# Patient Record
Sex: MALE | Employment: OTHER | URBAN - METROPOLITAN AREA
[De-identification: names, ages, dates, MRNs, and addresses within clinical notes are randomized per-mention and may not be internally consistent; named-entity substitution may affect disease eponyms.]

---

## 2017-12-22 ENCOUNTER — PREPPED CHART (OUTPATIENT)
Dept: URBAN - METROPOLITAN AREA CLINIC 36 | Facility: CLINIC | Age: 47
End: 2017-12-22

## 2017-12-22 ASSESSMENT — VISUAL ACUITY
OD_SC: 20/20-1
OS_PH: 20/20
OS_SC: J2
OD_SC: J3
OS_SC: 20/30-1

## 2017-12-22 ASSESSMENT — TONOMETRY
OS_IOP_MMHG: 15
OD_IOP_MMHG: 14

## 2017-12-26 ENCOUNTER — ESTABLISHED COMPREHENSIVE EXAM (OUTPATIENT)
Dept: URBAN - METROPOLITAN AREA CLINIC 36 | Facility: CLINIC | Age: 47
End: 2017-12-26

## 2017-12-26 DIAGNOSIS — H04.123: ICD-10-CM

## 2017-12-26 DIAGNOSIS — Z96.1: ICD-10-CM

## 2017-12-26 DIAGNOSIS — H18.51: ICD-10-CM

## 2017-12-26 PROCEDURE — 1036F TOBACCO NON-USER: CPT

## 2017-12-26 PROCEDURE — 92015 DETERMINE REFRACTIVE STATE: CPT

## 2017-12-26 PROCEDURE — G8427 DOCREV CUR MEDS BY ELIG CLIN: HCPCS

## 2017-12-26 PROCEDURE — 92014 COMPRE OPH EXAM EST PT 1/>: CPT

## 2017-12-26 ASSESSMENT — VISUAL ACUITY
OD_SC: 20/20
OS_PH: 20/20-2
OS_SC: J1
OS_SC: 20/40-1
OD_SC: J2

## 2017-12-26 ASSESSMENT — TONOMETRY
OS_IOP_MMHG: 10
OD_IOP_MMHG: 11

## 2018-12-14 ENCOUNTER — ESTABLISHED COMPREHENSIVE EXAM (OUTPATIENT)
Dept: URBAN - METROPOLITAN AREA CLINIC 36 | Facility: CLINIC | Age: 48
End: 2018-12-14

## 2018-12-14 DIAGNOSIS — H26.491: ICD-10-CM

## 2018-12-14 DIAGNOSIS — H26.492: ICD-10-CM

## 2018-12-14 DIAGNOSIS — H04.123: ICD-10-CM

## 2018-12-14 PROCEDURE — 92014 COMPRE OPH EXAM EST PT 1/>: CPT

## 2018-12-14 PROCEDURE — 92015GRNC REFRACTION GLASSES RECHECK - NO CHARGE

## 2018-12-14 ASSESSMENT — VISUAL ACUITY
OS_SC: 20/25-1
OD_SC: J3
OD_SC: 20/20-2
OS_SC: J1

## 2018-12-14 ASSESSMENT — TONOMETRY
OD_IOP_MMHG: 10
OS_IOP_MMHG: 9

## 2021-07-02 ENCOUNTER — DILATED FUNDUS EXAM (OUTPATIENT)
Dept: URBAN - METROPOLITAN AREA CLINIC 36 | Facility: CLINIC | Age: 51
End: 2021-07-02

## 2021-07-02 DIAGNOSIS — H26.492: ICD-10-CM

## 2021-07-02 DIAGNOSIS — H26.491: ICD-10-CM

## 2021-07-02 DIAGNOSIS — H04.123: ICD-10-CM

## 2021-07-02 PROCEDURE — 92014 COMPRE OPH EXAM EST PT 1/>: CPT

## 2021-07-02 ASSESSMENT — VISUAL ACUITY
OD_SC: 20/25+2
OD_SC: J2
OS_SC: 20/25
OS_SC: J1

## 2021-07-02 ASSESSMENT — TONOMETRY
OS_IOP_MMHG: 13
OD_IOP_MMHG: 13

## 2023-10-05 ENCOUNTER — ESTABLISHED PATIENT (OUTPATIENT)
Dept: URBAN - METROPOLITAN AREA CLINIC 36 | Facility: CLINIC | Age: 53
End: 2023-10-05

## 2023-10-05 DIAGNOSIS — H26.493: ICD-10-CM

## 2023-10-05 DIAGNOSIS — H04.123: ICD-10-CM

## 2023-10-05 PROCEDURE — 92014 COMPRE OPH EXAM EST PT 1/>: CPT

## 2023-10-05 ASSESSMENT — VISUAL ACUITY
OS_SC: J3
OD_SC: 20/15
OS_SC: 20/20-2
OU_SC: 20/15
OD_SC: J3

## 2023-10-05 ASSESSMENT — TONOMETRY
OD_IOP_MMHG: 14
OS_IOP_MMHG: 14

## 2024-09-04 NOTE — PATIENT DISCUSSION
Patient understands condition, prognosis and need for follow up care. SUBJECTIVE: The pt voices that he hopes the ortho is able to assist with the knee pain at Friday's appt.   .  PAIN: see pain assessment    OBJECTIVE: see interventions  .  NEXT MD APPT: Wednesday 8/28/24 - [facility] f/u appt, 9/6/24 with PCP  .  CAREGIVER ASSISTANCE NEEDED FOR: The pt lives with his wife and son. His son is his primary CG. He requires assistance for ADLs, transportation, shopping, meals, medication management, safety.  .  ASSESSMENT AND PROGRESS TOWARD GOALS/PATIENT RESPONSE TO TREATMENT: The pt demonstrated a positive result in HHPT on this date as noted with improved WC<>bed transfers using the sliding board with the ability to (I) place the board when transitioning from the bed to the WC and requiring S for the sliding for both transitions. R knee pain continues to limit standing/stand-pivot transfers and ambulation.     PATIENT LEVEL OF UNDERSTANDING OF EDUCATION: Good - the pt able to teach back components of sit to stand transfers post education on this date.     CONTINUED NEED FOR THE FOLLOWING SKILLS: HH PT is medically necessary to address pain, decreased ROM, decreased strength, increased swelling, impaired bed mobility, decreased independence with functional transfers, impaired gait, and impaired balance in order to improve functional independence, quality of life, return to PLOF, reduce the risk for falls, and reduce pain.  .  PLAN: Plan to continue to progress strength for improved mobility and the goal of returning to ambulation.   .  DISCHARGE PLANNING DISCUSSED: Discharge to self and family under MD supervision once all goals have been met or patient has reached maximum potential. Discussed with the pt the POC to continue HHPT with the remaining frequency of 1 more visits this week then 3w1, 1w1. The pt is in agreement to the POC at this time.